# Patient Record
(demographics unavailable — no encounter records)

---

## 2025-06-06 NOTE — PHYSICAL EXAM
[Alert] : alert [No Acute Distress] : no acute distress [Normocephalic] : normocephalic [Conjunctivae with no discharge] : conjunctivae with no discharge [PERRL] : PERRL [EOMI Bilateral] : EOMI bilateral [Auricles Well Formed] : auricles well formed [Clear Tympanic membranes with present light reflex and bony landmarks] : clear tympanic membranes with present light reflex and bony landmarks [No Discharge] : no discharge [Nares Patent] : nares patent [Pink Nasal Mucosa] : pink nasal mucosa [Palate Intact] : palate intact [Nonerythematous Oropharynx] : nonerythematous oropharynx [Supple, full passive range of motion] : supple, full passive range of motion [Symmetric Chest Rise] : symmetric chest rise [No Palpable Masses] : no palpable masses [Clear to Auscultation Bilaterally] : clear to auscultation bilaterally [Regular Rate and Rhythm] : regular rate and rhythm [No Murmurs] : no murmurs [Normal S1, S2 present] : normal S1, S2 present [+2 Femoral Pulses] : +2 femoral pulses [Soft] : soft [NonTender] : non tender [Non Distended] : non distended [Normoactive Bowel Sounds] : normoactive bowel sounds [No Hepatomegaly] : no hepatomegaly [No Splenomegaly] : no splenomegaly [Les: ____] : Les [unfilled] [Les: _____] : Les [unfilled] [No Abnormal Lymph Nodes Palpated] : no abnormal lymph nodes palpated [No Gait Asymmetry] : no gait asymmetry [No pain or deformities with palpation of bone, muscles, joints] : no pain or deformities with palpation of bone, muscles, joints [Normal Muscle Tone] : normal muscle tone [Straight] : straight [+2 Patella DTR] : +2 patella DTR [Cranial Nerves Grossly Intact] : cranial nerves grossly intact [de-identified] : L ELBOW PROTRUDING; FROM, NO EDEMA OR ERYTHEMA [de-identified] : MILD HYPOPIGMENTED WHITE LESIONS B/L FACIAL CHEEKS

## 2025-06-06 NOTE — HISTORY OF PRESENT ILLNESS
[Parents] : parents [Grade ___] : Grade [unfilled] [Fruit] : fruit [Vegetables] : vegetables [Meat] : meat [Up to date] : Up to date [Normal] : Normal [No] : Patient does not go to dentist yearly [Toothpaste] : Primary Fluoride Source: Toothpaste [[email or web address]] : PT FOR INITIAL VISIT; DENIES RECENT ILLNESSES; IMMIGRATED FROM [facility] 2YEARS PRIOR [de-identified] : HAD L ELBOW SUREGERY 2YEARS PRIOR DUE TO FRACTURE REPAIR WITH DEFORMED ELBOW [de-identified] :

## 2025-06-06 NOTE — REVIEW OF SYSTEMS
[Restriction of Motion] : no restriction of motion [Redness of Joint] : no redness of joint [Joint Pain] : no joint pain [Muscle Pain] : no muscle pain [Back pain] : no back pain [Negative] : Genitourinary